# Patient Record
Sex: FEMALE | Race: WHITE | NOT HISPANIC OR LATINO | Employment: OTHER | URBAN - METROPOLITAN AREA
[De-identification: names, ages, dates, MRNs, and addresses within clinical notes are randomized per-mention and may not be internally consistent; named-entity substitution may affect disease eponyms.]

---

## 2017-12-28 ENCOUNTER — FOLLOW UP (OUTPATIENT)
Dept: URBAN - METROPOLITAN AREA CLINIC 39 | Facility: CLINIC | Age: 72
End: 2017-12-28

## 2017-12-28 DIAGNOSIS — H04.123: ICD-10-CM

## 2017-12-28 DIAGNOSIS — H26.493: ICD-10-CM

## 2017-12-28 PROCEDURE — 1036F TOBACCO NON-USER: CPT

## 2017-12-28 PROCEDURE — 92012 INTRM OPH EXAM EST PATIENT: CPT

## 2017-12-28 PROCEDURE — G8428 CUR MEDS NOT DOCUMENT: HCPCS

## 2017-12-28 ASSESSMENT — VISUAL ACUITY
OS_SC: 20/25
OS_SC: J8
OD_SC: J3
OD_SC: 20/30-1
OU_SC: 20/25+2
OU_SC: J1-

## 2017-12-28 ASSESSMENT — TONOMETRY
OS_IOP_MMHG: 10
OD_IOP_MMHG: 10

## 2018-01-04 ENCOUNTER — REFRACTION ONLY (OUTPATIENT)
Dept: URBAN - METROPOLITAN AREA CLINIC 39 | Facility: CLINIC | Age: 73
End: 2018-01-04

## 2018-01-04 DIAGNOSIS — H26.493: ICD-10-CM

## 2018-01-04 PROCEDURE — 92015GRNC REFRACTION GLASSES RECHECK - NO CHARGE

## 2018-01-04 ASSESSMENT — VISUAL ACUITY
OD_SC: J6
OD_SC: 20/30
OS_SC: J10
OU_SC: 20/25-2
OD_BAT: 20/30-2
OU_SC: J6
OS_SC: 20/25-2

## 2018-01-22 ENCOUNTER — ESTABLISHED PATIENT (OUTPATIENT)
Dept: URBAN - METROPOLITAN AREA CLINIC 39 | Facility: CLINIC | Age: 73
End: 2018-01-22

## 2018-01-22 ENCOUNTER — SURGERY/PROCEDURE (OUTPATIENT)
Dept: URBAN - METROPOLITAN AREA SURGERY 14 | Facility: SURGERY | Age: 73
End: 2018-01-22

## 2018-01-22 VITALS
HEART RATE: 75 BPM | RESPIRATION RATE: 16 BRPM | DIASTOLIC BLOOD PRESSURE: 73 MMHG | SYSTOLIC BLOOD PRESSURE: 107 MMHG | HEIGHT: 55 IN

## 2018-01-22 DIAGNOSIS — H26.491: ICD-10-CM

## 2018-01-22 PROCEDURE — G8783 BP SCRN PERF REC INTERVAL: HCPCS

## 2018-01-22 PROCEDURE — 1036F TOBACCO NON-USER: CPT

## 2018-01-22 PROCEDURE — 92014 COMPRE OPH EXAM EST PT 1/>: CPT

## 2018-01-22 PROCEDURE — G8427 DOCREV CUR MEDS BY ELIG CLIN: HCPCS

## 2018-01-22 PROCEDURE — 66821 AFTER CATARACT LASER SURGERY: CPT

## 2018-01-22 ASSESSMENT — VISUAL ACUITY
OU_SC: 20/20
OD_SC: J6 @ 20"
OS_SC: 20/25+2
OD_BAT: 20/50
OD_SC: 20/25
OS_BAT: 20/60
OS_SC: J10 @ 20"

## 2018-02-05 ENCOUNTER — SURGERY/PROCEDURE (OUTPATIENT)
Dept: URBAN - METROPOLITAN AREA SURGERY 14 | Facility: SURGERY | Age: 73
End: 2018-02-05

## 2018-02-05 ENCOUNTER — ESTABLISHED PATIENT (OUTPATIENT)
Dept: URBAN - METROPOLITAN AREA CLINIC 39 | Facility: CLINIC | Age: 73
End: 2018-02-05

## 2018-02-05 DIAGNOSIS — H26.492: ICD-10-CM

## 2018-02-05 PROCEDURE — 66821 AFTER CATARACT LASER SURGERY: CPT

## 2018-02-05 PROCEDURE — G8428 CUR MEDS NOT DOCUMENT: HCPCS

## 2018-02-05 PROCEDURE — 4040F PNEUMOC VAC/ADMIN/RCVD: CPT

## 2018-02-05 PROCEDURE — 99213 OFFICE O/P EST LOW 20 MIN: CPT

## 2018-02-05 PROCEDURE — 1036F TOBACCO NON-USER: CPT

## 2018-02-05 PROCEDURE — G8482 FLU IMMUNIZE ORDER/ADMIN: HCPCS

## 2018-02-05 ASSESSMENT — VISUAL ACUITY
OD_SC: J2
OS_BAT: 20/60
OS_SC: 20/20
OD_SC: 20/25

## 2018-02-05 ASSESSMENT — TONOMETRY
OS_IOP_MMHG: 13
OD_IOP_MMHG: 13

## 2018-02-15 ENCOUNTER — POST-OP CATARACT (OUTPATIENT)
Dept: URBAN - METROPOLITAN AREA CLINIC 39 | Facility: CLINIC | Age: 73
End: 2018-02-15

## 2018-02-15 DIAGNOSIS — Z98.890: ICD-10-CM

## 2018-02-15 PROCEDURE — 99024 POSTOP FOLLOW-UP VISIT: CPT

## 2018-02-15 ASSESSMENT — TONOMETRY
OD_IOP_MMHG: 8
OS_IOP_MMHG: 10

## 2018-02-15 ASSESSMENT — VISUAL ACUITY
OU_SC: 20/25-1
OU_SC: J4
OS_SC: 20/30-2
OD_SC: J4-
OS_SC: J12
OD_SC: 20/25-1

## 2018-04-23 ENCOUNTER — POST-OP (OUTPATIENT)
Dept: URBAN - METROPOLITAN AREA CLINIC 39 | Facility: CLINIC | Age: 73
End: 2018-04-23

## 2018-04-23 DIAGNOSIS — Z98.890: ICD-10-CM

## 2018-04-23 PROCEDURE — 99024 POSTOP FOLLOW-UP VISIT: CPT

## 2018-04-23 ASSESSMENT — VISUAL ACUITY
OS_SC: J6 @ 20"
OD_SC: 20/25+2
OS_SC: 20/30-1

## 2018-04-27 ENCOUNTER — CONTACT LENS FOLLOW UP (OUTPATIENT)
Dept: URBAN - METROPOLITAN AREA CLINIC 39 | Facility: CLINIC | Age: 73
End: 2018-04-27

## 2018-04-27 DIAGNOSIS — Z96.1: ICD-10-CM

## 2018-04-27 PROCEDURE — 92310AV CL MGMNT ADVANCED VISION TRIAL ALL INCL

## 2018-04-27 ASSESSMENT — VISUAL ACUITY
OU_SC: J3 @ 22"
OU_SC: 20/25-1
OS_SC: 20/25-1
OD_SC: 20/30

## 2018-05-10 ENCOUNTER — SURGERY (OUTPATIENT)
Dept: URBAN - METROPOLITAN AREA CLINIC 39 | Facility: CLINIC | Age: 73
End: 2018-05-10

## 2018-05-10 DIAGNOSIS — H52.7: ICD-10-CM

## 2018-05-10 PROCEDURE — 66999ISPP INTRALASE LASIK S/P ADVANCED / CUSTOM VISION < 12 MONTHS

## 2018-05-11 ENCOUNTER — 1 DAY LASIK POST OP (OUTPATIENT)
Dept: URBAN - METROPOLITAN AREA CLINIC 39 | Facility: CLINIC | Age: 73
End: 2018-05-11

## 2018-05-11 DIAGNOSIS — Z98.890: ICD-10-CM

## 2018-05-11 PROCEDURE — 99024 POSTOP FOLLOW-UP VISIT: CPT

## 2018-05-11 ASSESSMENT — VISUAL ACUITY
OD_SC: 20/100
OD_SC: J3
OD_PH: 20/40

## 2018-05-14 ENCOUNTER — EST. PATIENT EMERGENCY (OUTPATIENT)
Dept: URBAN - METROPOLITAN AREA CLINIC 35 | Facility: CLINIC | Age: 73
End: 2018-05-14

## 2018-05-14 DIAGNOSIS — Z98.890: ICD-10-CM

## 2018-05-14 DIAGNOSIS — H04.123: ICD-10-CM

## 2018-05-14 PROCEDURE — 6699955 NON-COMANAGED LASIK PO

## 2018-05-14 PROCEDURE — 68761S PUNCTUM PLUG / SILICONE,EACH

## 2018-05-14 PROCEDURE — A4263 PERMANENT TEAR DUCT PLUG: HCPCS

## 2018-05-14 ASSESSMENT — VISUAL ACUITY
OD_SC: 20/200
OS_SC: 20/20

## 2018-06-13 ENCOUNTER — POST-OP (OUTPATIENT)
Dept: URBAN - METROPOLITAN AREA CLINIC 39 | Facility: CLINIC | Age: 73
End: 2018-06-13

## 2018-06-13 DIAGNOSIS — Z98.890: ICD-10-CM

## 2018-06-13 DIAGNOSIS — B00.52: ICD-10-CM

## 2018-06-13 PROCEDURE — 99024 POSTOP FOLLOW-UP VISIT: CPT

## 2018-06-13 RX ORDER — VALACYCLOVIR HYDROCHLORIDE 500 MG/1: 1 TABLET, FILM COATED ORAL TWICE A DAY

## 2018-06-13 RX ORDER — TRIFLURIDINE 1 G/100ML: 1 SOLUTION OPHTHALMIC

## 2018-06-13 ASSESSMENT — VISUAL ACUITY
OS_SC: 20/20
OU_SC: J1 @ 15"
OS_SC: J4
OU_SC: 20/20
OD_SC: J1 @ 15"

## 2018-06-13 ASSESSMENT — TONOMETRY
OD_IOP_MMHG: 8
OS_IOP_MMHG: 8

## 2018-06-18 ENCOUNTER — POST-OP (OUTPATIENT)
Dept: URBAN - METROPOLITAN AREA CLINIC 39 | Facility: CLINIC | Age: 73
End: 2018-06-18

## 2018-06-18 DIAGNOSIS — B00.52: ICD-10-CM

## 2018-06-18 DIAGNOSIS — Z98.890: ICD-10-CM

## 2018-06-18 PROCEDURE — 99024 POSTOP FOLLOW-UP VISIT: CPT

## 2018-06-18 RX ORDER — GANCICLOVIR 1.5 MG/G: 1 GEL OPHTHALMIC

## 2018-06-18 ASSESSMENT — VISUAL ACUITY
OD_PH: 20/40
OD_SC: 20/200
OS_SC: 20/20

## 2018-06-29 ENCOUNTER — POST-OP (OUTPATIENT)
Dept: URBAN - METROPOLITAN AREA CLINIC 39 | Facility: CLINIC | Age: 73
End: 2018-06-29

## 2018-06-29 DIAGNOSIS — Z98.890: ICD-10-CM

## 2018-06-29 DIAGNOSIS — B00.52: ICD-10-CM

## 2018-06-29 PROCEDURE — 99024 POSTOP FOLLOW-UP VISIT: CPT

## 2018-06-29 ASSESSMENT — VISUAL ACUITY
OU_SC: J1
OD_SC: 20/200+1
OU_SC: 20/20-1
OS_SC: 20/25+2
OS_SC: J5
OD_SC: J1

## 2018-06-29 ASSESSMENT — TONOMETRY
OS_IOP_MMHG: 8
OD_IOP_MMHG: 10

## 2018-07-07 ENCOUNTER — EST. PATIENT EMERGENCY (OUTPATIENT)
Age: 73
End: 2018-07-07

## 2018-07-07 DIAGNOSIS — B00.52: ICD-10-CM

## 2018-07-07 DIAGNOSIS — Z98.890: ICD-10-CM

## 2018-07-07 PROCEDURE — 92012 INTRM OPH EXAM EST PATIENT: CPT

## 2018-07-07 PROCEDURE — 1036F TOBACCO NON-USER: CPT

## 2018-07-07 PROCEDURE — G8428 CUR MEDS NOT DOCUMENT: HCPCS

## 2018-07-07 RX ORDER — ACYCLOVIR 400 MG/1
1 TABLET ORAL
Start: 2018-07-07

## 2018-07-07 ASSESSMENT — VISUAL ACUITY
OD_SC: 20/200
OU_SC: 20/20-1
OS_SC: 20/20-2

## 2018-07-07 ASSESSMENT — TONOMETRY: OD_IOP_MMHG: 10

## 2018-07-23 ENCOUNTER — FOLLOW UP (OUTPATIENT)
Dept: URBAN - METROPOLITAN AREA CLINIC 43 | Facility: CLINIC | Age: 73
End: 2018-07-23

## 2018-07-23 DIAGNOSIS — B00.52: ICD-10-CM

## 2018-07-23 PROCEDURE — G8427 DOCREV CUR MEDS BY ELIG CLIN: HCPCS

## 2018-07-23 PROCEDURE — 1036F TOBACCO NON-USER: CPT

## 2018-07-23 PROCEDURE — G8785 BP SCRN NO PERF AT INTERVAL: HCPCS

## 2018-07-23 PROCEDURE — 92012 INTRM OPH EXAM EST PATIENT: CPT

## 2018-07-23 RX ORDER — VALACYCLOVIR HYDROCHLORIDE 500 MG/1: 1 TABLET ORAL ONCE A DAY

## 2018-07-23 ASSESSMENT — VISUAL ACUITY
OS_SC: 20/20-2
OD_SC: 20/100-1

## 2018-07-23 ASSESSMENT — TONOMETRY
OS_IOP_MMHG: 13
OD_IOP_MMHG: 11

## 2018-08-07 ENCOUNTER — FOLLOW UP (OUTPATIENT)
Dept: URBAN - METROPOLITAN AREA CLINIC 39 | Facility: CLINIC | Age: 73
End: 2018-08-07

## 2018-08-07 DIAGNOSIS — H04.123: ICD-10-CM

## 2018-08-07 DIAGNOSIS — B00.52: ICD-10-CM

## 2018-08-07 PROCEDURE — G8427 DOCREV CUR MEDS BY ELIG CLIN: HCPCS

## 2018-08-07 PROCEDURE — G8785 BP SCRN NO PERF AT INTERVAL: HCPCS

## 2018-08-07 PROCEDURE — 92012 INTRM OPH EXAM EST PATIENT: CPT

## 2018-08-07 PROCEDURE — 1036F TOBACCO NON-USER: CPT

## 2018-08-07 PROCEDURE — G9903 PT SCRN TBCO ID AS NON USER: HCPCS

## 2018-08-07 RX ORDER — MINERAL OIL, PETROLATUM 425; 573 MG/G; MG/G: OINTMENT OPHTHALMIC EVERY EVENING

## 2018-08-07 ASSESSMENT — VISUAL ACUITY
OD_SC: 20/100-1
OD_PH: 20/40
OS_SC: 20/20

## 2018-08-07 ASSESSMENT — TONOMETRY
OD_IOP_MMHG: 17
OS_IOP_MMHG: 16

## 2018-11-03 ENCOUNTER — EST. PATIENT EMERGENCY (OUTPATIENT)
Age: 73
End: 2018-11-03

## 2018-11-03 DIAGNOSIS — H04.123: ICD-10-CM

## 2018-11-03 DIAGNOSIS — B00.52: ICD-10-CM

## 2018-11-03 PROCEDURE — 1036F TOBACCO NON-USER: CPT

## 2018-11-03 PROCEDURE — G9903 PT SCRN TBCO ID AS NON USER: HCPCS

## 2018-11-03 PROCEDURE — G8428 CUR MEDS NOT DOCUMENT: HCPCS

## 2018-11-03 PROCEDURE — 92012 INTRM OPH EXAM EST PATIENT: CPT

## 2018-11-03 ASSESSMENT — TONOMETRY
OD_IOP_MMHG: 14
OS_IOP_MMHG: 13

## 2018-11-03 ASSESSMENT — VISUAL ACUITY
OU_SC: 20/50+2
OD_SC: 20/400
OS_SC: 20/50+2

## 2018-11-06 ENCOUNTER — FOLLOW UP (OUTPATIENT)
Dept: URBAN - METROPOLITAN AREA CLINIC 39 | Facility: CLINIC | Age: 73
End: 2018-11-06

## 2018-11-06 DIAGNOSIS — B00.52: ICD-10-CM

## 2018-11-06 PROCEDURE — G9903 PT SCRN TBCO ID AS NON USER: HCPCS

## 2018-11-06 PROCEDURE — G8785 BP SCRN NO PERF AT INTERVAL: HCPCS

## 2018-11-06 PROCEDURE — G8427 DOCREV CUR MEDS BY ELIG CLIN: HCPCS

## 2018-11-06 PROCEDURE — 92012 INTRM OPH EXAM EST PATIENT: CPT

## 2018-11-06 PROCEDURE — 1036F TOBACCO NON-USER: CPT

## 2018-11-06 RX ORDER — TRIFLURIDINE 1 G/100ML: 1 SOLUTION OPHTHALMIC

## 2018-11-06 ASSESSMENT — VISUAL ACUITY
OD_SC: 20/200
OS_SC: 20/30-2
OD_SC: J2
OD_PH: 20/40-2

## 2018-11-06 ASSESSMENT — TONOMETRY
OS_IOP_MMHG: 13
OD_IOP_MMHG: 13

## 2018-11-20 ENCOUNTER — FOLLOW UP (OUTPATIENT)
Dept: URBAN - METROPOLITAN AREA CLINIC 39 | Facility: CLINIC | Age: 73
End: 2018-11-20

## 2018-11-20 DIAGNOSIS — B00.52: ICD-10-CM

## 2018-11-20 PROCEDURE — G8427 DOCREV CUR MEDS BY ELIG CLIN: HCPCS

## 2018-11-20 PROCEDURE — G8482 FLU IMMUNIZE ORDER/ADMIN: HCPCS

## 2018-11-20 PROCEDURE — 99212 OFFICE O/P EST SF 10 MIN: CPT

## 2018-11-20 PROCEDURE — G9903 PT SCRN TBCO ID AS NON USER: HCPCS

## 2018-11-20 PROCEDURE — 4040F PNEUMOC VAC/ADMIN/RCVD: CPT

## 2018-11-20 PROCEDURE — 1036F TOBACCO NON-USER: CPT

## 2018-11-20 PROCEDURE — G8785 BP SCRN NO PERF AT INTERVAL: HCPCS

## 2018-11-20 ASSESSMENT — VISUAL ACUITY
OS_SC: 20/30-2
OD_SC: J2
OD_SC: 20/400
OS_SC: J12
OD_PH: 20/50+2

## 2018-11-20 ASSESSMENT — TONOMETRY
OS_IOP_MMHG: 12
OD_IOP_MMHG: 14

## 2019-02-05 ENCOUNTER — ESTABLISHED COMPREHENSIVE EXAM (OUTPATIENT)
Dept: URBAN - METROPOLITAN AREA CLINIC 38 | Facility: CLINIC | Age: 74
End: 2019-02-05

## 2019-02-05 DIAGNOSIS — B00.52: ICD-10-CM

## 2019-02-05 DIAGNOSIS — H50.10: ICD-10-CM

## 2019-02-05 DIAGNOSIS — H04.123: ICD-10-CM

## 2019-02-05 DIAGNOSIS — H43.23: ICD-10-CM

## 2019-02-05 DIAGNOSIS — H18.51: ICD-10-CM

## 2019-02-05 PROCEDURE — 92014 COMPRE OPH EXAM EST PT 1/>: CPT

## 2019-02-05 PROCEDURE — 92015 DETERMINE REFRACTIVE STATE: CPT

## 2019-02-05 ASSESSMENT — VISUAL ACUITY
OD_SC: 20/30-2
OS_PH: 20/40
OU_SC: 20/30
OS_SC: J12
OU_SC: J2
OD_SC: J2

## 2019-02-05 ASSESSMENT — TONOMETRY
OS_IOP_MMHG: 11
OD_IOP_MMHG: 11

## 2019-02-12 ENCOUNTER — FOLLOW UP (OUTPATIENT)
Dept: URBAN - METROPOLITAN AREA CLINIC 38 | Facility: CLINIC | Age: 74
End: 2019-02-12

## 2019-02-12 DIAGNOSIS — Z96.1: ICD-10-CM

## 2019-02-12 PROCEDURE — 99024 POSTOP FOLLOW-UP VISIT: CPT

## 2019-02-12 ASSESSMENT — VISUAL ACUITY
OD_PH: 20/40
OS_SC: J12
OD_SC: J3
OS_SC: 20/50
OD_SC: 20/80
OS_PH: 20/30-2

## 2019-02-12 ASSESSMENT — TONOMETRY
OS_IOP_MMHG: 12
OD_IOP_MMHG: 14

## 2019-02-26 ENCOUNTER — CONTACT LENS FOLLOW UP (OUTPATIENT)
Dept: URBAN - METROPOLITAN AREA CLINIC 39 | Facility: CLINIC | Age: 74
End: 2019-02-26

## 2019-02-26 DIAGNOSIS — Z98.890: ICD-10-CM

## 2019-02-26 PROCEDURE — 99024 POSTOP FOLLOW-UP VISIT: CPT

## 2019-02-26 ASSESSMENT — VISUAL ACUITY
OD_CC: J1
OS_CC: J5
OU_CC: 20/30
OS_CC: 20/25
OD_CC: 20/100
OU_CC: J1-

## 2019-02-27 ENCOUNTER — POST-OP (OUTPATIENT)
Dept: URBAN - METROPOLITAN AREA CLINIC 43 | Facility: CLINIC | Age: 74
End: 2019-02-27

## 2019-02-27 DIAGNOSIS — H52.7: ICD-10-CM

## 2019-02-27 DIAGNOSIS — H18.51: ICD-10-CM

## 2019-02-27 PROCEDURE — 99024 POSTOP FOLLOW-UP VISIT: CPT

## 2019-02-27 PROCEDURE — 92025 CPTRIZED CORNEAL TOPOGRAPHY: CPT

## 2019-02-27 ASSESSMENT — VISUAL ACUITY
OU_SC: 20/50+1
OD_SC: 20/100-1
OU_SC: J3 @ 17"
OD_SC: J3 @ 17"
OS_SC: 20/50

## 2019-03-11 ENCOUNTER — CORNEA CONSULT (OUTPATIENT)
Dept: URBAN - METROPOLITAN AREA CLINIC 43 | Facility: CLINIC | Age: 74
End: 2019-03-11

## 2019-03-11 DIAGNOSIS — H04.123: ICD-10-CM

## 2019-03-11 DIAGNOSIS — H50.52: ICD-10-CM

## 2019-03-11 DIAGNOSIS — H43.23: ICD-10-CM

## 2019-03-11 DIAGNOSIS — B00.52: ICD-10-CM

## 2019-03-11 PROCEDURE — 92014 COMPRE OPH EXAM EST PT 1/>: CPT

## 2019-03-11 RX ORDER — MOXIFLOXACIN HYDROCHLORIDE 5 MG/ML: 1 SOLUTION/ DROPS OPHTHALMIC

## 2019-03-11 RX ORDER — PREDNISOLONE ACETATE 10 MG/ML: 1 SUSPENSION/ DROPS OPHTHALMIC

## 2019-03-11 ASSESSMENT — VISUAL ACUITY
OS_PH: 20/30-2
OD_SC: 20/100
OS_SC: J12 @ 20"
OS_SC: 20/50+2
OD_PH: 20/40

## 2019-03-11 ASSESSMENT — TONOMETRY
OS_IOP_MMHG: 12
OD_IOP_MMHG: 12

## 2019-03-28 ENCOUNTER — CORNEA CONSULT (OUTPATIENT)
Dept: URBAN - METROPOLITAN AREA CLINIC 43 | Facility: CLINIC | Age: 74
End: 2019-03-28

## 2019-03-28 ENCOUNTER — SURGERY/PROCEDURE (OUTPATIENT)
Dept: URBAN - METROPOLITAN AREA CLINIC 39 | Facility: CLINIC | Age: 74
End: 2019-03-28

## 2019-03-28 DIAGNOSIS — H52.7: ICD-10-CM

## 2019-03-28 PROCEDURE — 66999ISPP1 INTRALASE LASIK S/P ADVANCED / CUSTOM VISION > 12 MONTHS

## 2019-03-29 ENCOUNTER — 1 DAY LASIK POST OP (OUTPATIENT)
Dept: URBAN - METROPOLITAN AREA CLINIC 39 | Facility: CLINIC | Age: 74
End: 2019-03-29

## 2019-03-29 DIAGNOSIS — Z98.890: ICD-10-CM

## 2019-03-29 PROCEDURE — 99024 POSTOP FOLLOW-UP VISIT: CPT

## 2019-03-29 ASSESSMENT — VISUAL ACUITY
OD_SC: 20/100
OD_PH: 20/40
OS_SC: 20/30
OD_SC: J2
OS_SC: J3

## 2019-03-29 ASSESSMENT — TONOMETRY: OD_IOP_MMHG: 14

## 2019-04-05 ENCOUNTER — LASIK POST OP (OUTPATIENT)
Dept: URBAN - METROPOLITAN AREA CLINIC 39 | Facility: CLINIC | Age: 74
End: 2019-04-05

## 2019-04-05 DIAGNOSIS — Z98.890: ICD-10-CM

## 2019-04-05 DIAGNOSIS — H04.123: ICD-10-CM

## 2019-04-05 PROCEDURE — A4262 TEMPORARY TEAR DUCT PLUG: HCPCS

## 2019-04-05 PROCEDURE — 68761Q PUNCTAL PLUG/QUINTESS DISSOLVABLE PLUG/EACH

## 2019-04-05 PROCEDURE — 99024 POSTOP FOLLOW-UP VISIT: CPT

## 2019-04-05 RX ORDER — MINERAL OIL 2; 2 MG/.4ML; MG/.4ML: 1 EMULSION OPHTHALMIC

## 2019-04-05 ASSESSMENT — VISUAL ACUITY
OS_PH: 20/40
OD_PH: 20/40
OS_SC: 20/50
OD_SC: 20/200+1
OS_SC: J10
OD_SC: J2

## 2019-04-10 ENCOUNTER — EST. PATIENT EMERGENCY (OUTPATIENT)
Dept: URBAN - METROPOLITAN AREA CLINIC 35 | Facility: CLINIC | Age: 74
End: 2019-04-10

## 2019-04-10 DIAGNOSIS — H04.123: ICD-10-CM

## 2019-04-10 DIAGNOSIS — B00.52: ICD-10-CM

## 2019-04-10 PROCEDURE — 92012 INTRM OPH EXAM EST PATIENT: CPT

## 2019-04-10 ASSESSMENT — VISUAL ACUITY
OS_SC: 20/40
OD_SC: 20/200

## 2019-04-16 ENCOUNTER — FOLLOW UP (OUTPATIENT)
Dept: URBAN - METROPOLITAN AREA CLINIC 39 | Facility: CLINIC | Age: 74
End: 2019-04-16

## 2019-04-16 DIAGNOSIS — H04.123: ICD-10-CM

## 2019-04-16 DIAGNOSIS — B00.52: ICD-10-CM

## 2019-04-16 DIAGNOSIS — Z98.890: ICD-10-CM

## 2019-04-16 PROCEDURE — 99213 OFFICE O/P EST LOW 20 MIN: CPT

## 2019-04-16 ASSESSMENT — TONOMETRY
OD_IOP_MMHG: 11
OS_IOP_MMHG: 10

## 2019-04-16 ASSESSMENT — VISUAL ACUITY
OS_SC: 20/25-2
OD_SC: 20/400
OD_SC: J2
OS_SC: J8

## 2019-05-28 ENCOUNTER — FOLLOW UP (OUTPATIENT)
Dept: URBAN - METROPOLITAN AREA CLINIC 39 | Facility: CLINIC | Age: 74
End: 2019-05-28

## 2019-05-28 DIAGNOSIS — B00.52: ICD-10-CM

## 2019-05-28 DIAGNOSIS — H04.123: ICD-10-CM

## 2019-05-28 PROCEDURE — 92012 INTRM OPH EXAM EST PATIENT: CPT

## 2019-05-28 ASSESSMENT — VISUAL ACUITY
OS_SC: 20/20-1
OS_SC: J8
OD_SC: 20/200
OD_SC: J2
OU_SC: 20/20
OU_SC: J2

## 2019-05-28 ASSESSMENT — TONOMETRY
OD_IOP_MMHG: 10
OS_IOP_MMHG: 10

## 2020-07-16 ENCOUNTER — ESTABLISHED COMPREHENSIVE EXAM (OUTPATIENT)
Dept: URBAN - METROPOLITAN AREA CLINIC 39 | Facility: CLINIC | Age: 75
End: 2020-07-16

## 2020-07-16 DIAGNOSIS — H50.52: ICD-10-CM

## 2020-07-16 DIAGNOSIS — H18.51: ICD-10-CM

## 2020-07-16 DIAGNOSIS — B00.52: ICD-10-CM

## 2020-07-16 DIAGNOSIS — H04.123: ICD-10-CM

## 2020-07-16 DIAGNOSIS — H43.23: ICD-10-CM

## 2020-07-16 PROCEDURE — 68761Q PUNCTAL PLUG/QUINTESS DISSOLVABLE PLUG/EACH

## 2020-07-16 PROCEDURE — A4262 TEMPORARY TEAR DUCT PLUG: HCPCS

## 2020-07-16 PROCEDURE — 92015 DETERMINE REFRACTIVE STATE: CPT

## 2020-07-16 PROCEDURE — 92014 COMPRE OPH EXAM EST PT 1/>: CPT

## 2020-07-16 ASSESSMENT — VISUAL ACUITY
OU_SC: 20/30
OD_SC: 20/200
OD_SC: J1
OS_SC: J3
OU_SC: J1
OS_SC: 20/25

## 2020-07-16 ASSESSMENT — TONOMETRY
OS_IOP_MMHG: 9
OD_IOP_MMHG: 10

## 2021-01-04 ENCOUNTER — FOLLOW UP (OUTPATIENT)
Dept: URBAN - METROPOLITAN AREA CLINIC 39 | Facility: CLINIC | Age: 76
End: 2021-01-04

## 2021-01-04 DIAGNOSIS — H04.123: ICD-10-CM

## 2021-01-04 DIAGNOSIS — B00.52: ICD-10-CM

## 2021-01-04 DIAGNOSIS — H02.88B: ICD-10-CM

## 2021-01-04 DIAGNOSIS — H02.88A: ICD-10-CM

## 2021-01-04 PROCEDURE — 92012 INTRM OPH EXAM EST PATIENT: CPT

## 2021-01-04 PROCEDURE — 68761Q PUNCTAL PLUG/QUINTESS DISSOLVABLE PLUG/EACH

## 2021-01-04 PROCEDURE — A4262 TEMPORARY TEAR DUCT PLUG: HCPCS

## 2021-01-04 ASSESSMENT — VISUAL ACUITY
OD_SC: 20/200
OD_PH: 20/25-1
OS_SC: 20/25

## 2021-07-07 ENCOUNTER — ESTABLISHED COMPREHENSIVE EXAM (OUTPATIENT)
Dept: URBAN - METROPOLITAN AREA CLINIC 39 | Facility: CLINIC | Age: 76
End: 2021-07-07

## 2021-07-07 DIAGNOSIS — Z96.1: ICD-10-CM

## 2021-07-07 DIAGNOSIS — H02.88B: ICD-10-CM

## 2021-07-07 DIAGNOSIS — H18.513: ICD-10-CM

## 2021-07-07 DIAGNOSIS — B00.52: ICD-10-CM

## 2021-07-07 DIAGNOSIS — H43.23: ICD-10-CM

## 2021-07-07 DIAGNOSIS — H04.123: ICD-10-CM

## 2021-07-07 DIAGNOSIS — H02.88A: ICD-10-CM

## 2021-07-07 PROCEDURE — A4262 TEMPORARY TEAR DUCT PLUG: HCPCS

## 2021-07-07 PROCEDURE — 92014 COMPRE OPH EXAM EST PT 1/>: CPT

## 2021-07-07 PROCEDURE — 68761Q PUNCTAL PLUG/QUINTESS DISSOLVABLE PLUG/EACH

## 2021-07-07 PROCEDURE — 92015 DETERMINE REFRACTIVE STATE: CPT

## 2021-07-07 ASSESSMENT — VISUAL ACUITY
OS_SC: 20/30
OD_SC: 20/200
OD_SC: J3
OS_SC: J6

## 2021-07-07 ASSESSMENT — TONOMETRY
OD_IOP_MMHG: 13
OS_IOP_MMHG: 12

## 2021-09-22 NOTE — PATIENT DISCUSSION
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 After risks, benefits, and alternatives were discussed, the patient would like to proceed with punctual plugging of bilateral lower and upper puncti. x 9/22/21 BLL & BUL.

## 2021-09-22 NOTE — PATIENT DISCUSSION
Recommend warm compresses qday OU. Also discussed iLux with patient. Consider in the future. Educated patient on OOP cost - brochure given.

## 2021-09-22 NOTE — PROCEDURE NOTE: CLINICAL
PROCEDURE NOTE: Punctal Plugs, Temporary OU. Diagnosis: Dry Eye Syndrome. Anesthesia: Topical. Prep: Antibiotic Drops q 5min x 3. Prior to treatment, the risks/benefits/alternatives were discussed. The patient wished to proceed with procedure. Temporary collagen plugs were inserted. Size/location of plugs inserted: 0.4mm placed BUL and BLL. Patient tolerated procedure well. There were no complications. Post procedure instructions given. Monty Harry

## 2021-09-22 NOTE — PATIENT DISCUSSION
Patient sleeps with a CPAP machine. Can use OTC ointment at bedtime and consider moisture chamber goggles at night. Recommend Oasis Tears Plus 2-4x/day OU, punctal plugs BLL and BUL today (0.4mm dissolvable). Will see if patient's insurance covers [place] - will send to [place]. If it is not covered, will send in Restasis to be used BID OU.

## 2022-01-04 NOTE — PATIENT DISCUSSION
1/4/21: Significant improvement with goggles, gel and drops, continue current management. Patient can return back to Dr. Dylon Parra (can replace with Dr. Ozzy Mclean).

## 2022-01-04 NOTE — PATIENT DISCUSSION
Disc Photos stable ou. Will continue care w/O.D in EW. If changes, send back to [facility].

## 2022-01-04 NOTE — PATIENT DISCUSSION
1/4/21: Significant improvement with goggles, gel and drops, continue current management. Patient can return back to Dr. Telma Pickard (can replace with Dr. Isaura Lala).

## 2022-01-04 NOTE — PATIENT DISCUSSION
9/22/21 JOD: Patient sleeps with a CPAP machine. Can use OTC ointment at bedtime and consider moisture chamber goggles at night. Recommend Oasis Tears Plus 2-4x/day OU, punctal plugs BLL and BUL today (0.4mm dissolvable). Will see if patient's insurance covers Gregory Kwan - will send to [place]. If it is not covered, will send in Restasis to be used BID OU. punctual plugging of bilateral lower and upper puncti. x 9/22/21 BLL & BUL.

## 2022-01-17 ENCOUNTER — FOLLOW UP (OUTPATIENT)
Dept: URBAN - METROPOLITAN AREA CLINIC 39 | Facility: CLINIC | Age: 77
End: 2022-01-17

## 2022-01-17 DIAGNOSIS — H04.123: ICD-10-CM

## 2022-01-17 PROCEDURE — 68761Q PUNCTAL PLUG/QUINTESS DISSOLVABLE PLUG/EACH

## 2022-01-17 PROCEDURE — A4262 TEMPORARY TEAR DUCT PLUG: HCPCS

## 2022-01-17 ASSESSMENT — VISUAL ACUITY
OD_SC: 20/200
OS_SC: 20/30-1
OU_SC: 20/30-1

## 2022-01-17 ASSESSMENT — TONOMETRY
OS_IOP_MMHG: 10
OD_IOP_MMHG: 8

## 2022-07-20 ENCOUNTER — COMPREHENSIVE EXAM (OUTPATIENT)
Dept: URBAN - METROPOLITAN AREA CLINIC 39 | Facility: CLINIC | Age: 77
End: 2022-07-20

## 2022-07-20 DIAGNOSIS — B00.52: ICD-10-CM

## 2022-07-20 DIAGNOSIS — Z98.890: ICD-10-CM

## 2022-07-20 DIAGNOSIS — H43.23: ICD-10-CM

## 2022-07-20 DIAGNOSIS — H18.513: ICD-10-CM

## 2022-07-20 DIAGNOSIS — H02.88A: ICD-10-CM

## 2022-07-20 DIAGNOSIS — Z96.1: ICD-10-CM

## 2022-07-20 DIAGNOSIS — H50.52: ICD-10-CM

## 2022-07-20 DIAGNOSIS — H04.123: ICD-10-CM

## 2022-07-20 DIAGNOSIS — H02.88B: ICD-10-CM

## 2022-07-20 PROCEDURE — 92015 DETERMINE REFRACTIVE STATE: CPT

## 2022-07-20 PROCEDURE — 92014 COMPRE OPH EXAM EST PT 1/>: CPT

## 2022-07-20 PROCEDURE — 68761Q PUNCTAL PLUG/QUINTESS DISSOLVABLE PLUG/EACH

## 2022-07-20 PROCEDURE — A4262 TEMPORARY TEAR DUCT PLUG: HCPCS

## 2022-07-20 ASSESSMENT — VISUAL ACUITY
OU_SC: J1
OD_SC: J2
OD_SC: 20/200+2
OS_SC: 20/60+1
OS_SC: J3
OU_SC: 20/50-1

## 2022-07-20 ASSESSMENT — TONOMETRY
OS_IOP_MMHG: 13
OD_IOP_MMHG: 18

## 2022-08-11 ENCOUNTER — FOLLOW UP (OUTPATIENT)
Dept: URBAN - METROPOLITAN AREA CLINIC 39 | Facility: CLINIC | Age: 77
End: 2022-08-11

## 2022-08-11 DIAGNOSIS — H50.52: ICD-10-CM

## 2022-08-11 DIAGNOSIS — Z98.890: ICD-10-CM

## 2022-08-11 PROCEDURE — 92012 INTRM OPH EXAM EST PATIENT: CPT

## 2022-08-11 ASSESSMENT — VISUAL ACUITY
OS_SC: J3
OU_SC: 20/50-2
OU_SC: J2
OS_SC: 20/60
OD_SC: J2
OD_SC: 20/200

## 2022-11-30 NOTE — PATIENT DISCUSSION
9/22/21 JOD: Patient sleeps with a CPAP machine. Can use OTC ointment at bedtime and consider moisture chamber goggles at night. Recommend Oasis Tears Plus 2-4x/day OU, punctal plugs BLL and BUL today (0.4mm dissolvable). Will see if patient's insurance covers Pasqual Domitila - will send to [place]. If it is not covered, will send in Restasis to be used BID OU. punctual plugging of bilateral lower and upper puncti. x 9/22/21 BLL & BUL.

## 2022-11-30 NOTE — PATIENT DISCUSSION
Disc Photos stable ou. Will continue care w/O.D in EW. If changes, send back to [facility].

## 2022-11-30 NOTE — PATIENT DISCUSSION
1/4/21: Significant improvement with goggles, gel and drops, continue current management. Patient can return back to Dr. Lise Thompson (can replace with Dr. Sukumar Dyer).

## 2022-11-30 NOTE — PATIENT DISCUSSION
1/4/21: Significant improvement with goggles, gel and drops, continue current management. Patient can return back to Dr. Anant Simmons (can replace with Dr. Anjum oHffmann).

## 2023-08-24 ENCOUNTER — COMPREHENSIVE EXAM (OUTPATIENT)
Dept: URBAN - METROPOLITAN AREA CLINIC 39 | Facility: CLINIC | Age: 78
End: 2023-08-24

## 2023-08-24 DIAGNOSIS — H50.52: ICD-10-CM

## 2023-08-24 DIAGNOSIS — Z96.1: ICD-10-CM

## 2023-08-24 DIAGNOSIS — H02.88B: ICD-10-CM

## 2023-08-24 DIAGNOSIS — Z98.890: ICD-10-CM

## 2023-08-24 DIAGNOSIS — H43.23: ICD-10-CM

## 2023-08-24 DIAGNOSIS — H02.88A: ICD-10-CM

## 2023-08-24 DIAGNOSIS — H04.123: ICD-10-CM

## 2023-08-24 DIAGNOSIS — H18.513: ICD-10-CM

## 2023-08-24 PROCEDURE — 92014 COMPRE OPH EXAM EST PT 1/>: CPT

## 2023-08-24 PROCEDURE — 92015 DETERMINE REFRACTIVE STATE: CPT

## 2023-08-24 PROCEDURE — A4262 TEMPORARY TEAR DUCT PLUG: HCPCS

## 2023-08-24 PROCEDURE — 68761Q PUNCTAL PLUG/QUINTESS DISSOLVABLE PLUG/EACH

## 2023-08-24 ASSESSMENT — VISUAL ACUITY
OS_SC: 20/40-1
OU_SC: J1+
OD_CC: J1+
OD_SC: J1+
OU_CC: J1+
OD_CC: 20/20-1
OS_CC: 20/20
OU_CC: 20/20
OS_CC: J1
OD_SC: 20/100
OS_SC: J1
OU_SC: 20/30-2

## 2023-08-24 ASSESSMENT — TONOMETRY
OD_IOP_MMHG: 12
OS_IOP_MMHG: 13

## 2024-02-27 ENCOUNTER — FOLLOW UP (OUTPATIENT)
Dept: URBAN - METROPOLITAN AREA CLINIC 39 | Facility: CLINIC | Age: 79
End: 2024-02-27

## 2024-02-27 DIAGNOSIS — H04.123: ICD-10-CM

## 2024-02-27 DIAGNOSIS — H02.88A: ICD-10-CM

## 2024-02-27 DIAGNOSIS — H02.88B: ICD-10-CM

## 2024-02-27 PROCEDURE — A4262 TEMPORARY TEAR DUCT PLUG: HCPCS

## 2024-02-27 PROCEDURE — 68761Q PUNCTAL PLUG/QUINTESS DISSOLVABLE PLUG/EACH

## 2024-02-27 PROCEDURE — 92012 INTRM OPH EXAM EST PATIENT: CPT

## 2024-02-27 ASSESSMENT — VISUAL ACUITY
OS_CC: 20/25
OU_CC: 20/20
OD_CC: 20/25

## 2024-02-27 ASSESSMENT — TONOMETRY
OD_IOP_MMHG: 12
OS_IOP_MMHG: 14

## 2024-08-27 ENCOUNTER — COMPREHENSIVE EXAM (OUTPATIENT)
Dept: URBAN - METROPOLITAN AREA CLINIC 39 | Facility: CLINIC | Age: 79
End: 2024-08-27

## 2024-08-27 DIAGNOSIS — B00.52: ICD-10-CM

## 2024-08-27 DIAGNOSIS — H50.52: ICD-10-CM

## 2024-08-27 DIAGNOSIS — H02.88B: ICD-10-CM

## 2024-08-27 DIAGNOSIS — Z98.890: ICD-10-CM

## 2024-08-27 DIAGNOSIS — H04.123: ICD-10-CM

## 2024-08-27 DIAGNOSIS — H43.23: ICD-10-CM

## 2024-08-27 DIAGNOSIS — H02.88A: ICD-10-CM

## 2024-08-27 DIAGNOSIS — H18.513: ICD-10-CM

## 2024-08-27 PROCEDURE — 92014 COMPRE OPH EXAM EST PT 1/>: CPT | Mod: 25

## 2024-08-27 PROCEDURE — 68761Q PUNCTAL PLUG/QUINTESS DISSOLVABLE PLUG/EACH: Mod: E2,E4

## 2024-08-27 PROCEDURE — A4262 TEMPORARY TEAR DUCT PLUG: HCPCS | Mod: E2,E4

## 2024-08-27 PROCEDURE — 92015 DETERMINE REFRACTIVE STATE: CPT

## 2024-08-27 ASSESSMENT — VISUAL ACUITY
OD_CC: J1
OD_CC: 20/25
OU_CC: 20/25
OS_CC: J3
OS_CC: 20/30+2
OU_CC: J1

## 2024-08-27 ASSESSMENT — TONOMETRY
OS_IOP_MMHG: 13
OD_IOP_MMHG: 14

## 2025-03-03 ENCOUNTER — FOLLOW UP (OUTPATIENT)
Age: 80
End: 2025-03-03

## 2025-03-03 DIAGNOSIS — H02.88B: ICD-10-CM

## 2025-03-03 DIAGNOSIS — H02.88A: ICD-10-CM

## 2025-03-03 DIAGNOSIS — H04.123: ICD-10-CM

## 2025-03-03 PROCEDURE — 68761Q PUNCTAL PLUG/QUINTESS DISSOLVABLE PLUG/EACH: Mod: E2,E4

## 2025-03-03 PROCEDURE — 92012 INTRM OPH EXAM EST PATIENT: CPT | Mod: 25

## 2025-03-03 PROCEDURE — A4262 TEMPORARY TEAR DUCT PLUG: HCPCS | Mod: E2,E4
